# Patient Record
Sex: FEMALE | Race: WHITE
[De-identification: names, ages, dates, MRNs, and addresses within clinical notes are randomized per-mention and may not be internally consistent; named-entity substitution may affect disease eponyms.]

---

## 2020-11-13 ENCOUNTER — HOSPITAL ENCOUNTER (EMERGENCY)
Dept: HOSPITAL 47 - EC | Age: 55
Discharge: HOME | End: 2020-11-13
Payer: MEDICARE

## 2020-11-13 VITALS — SYSTOLIC BLOOD PRESSURE: 133 MMHG | DIASTOLIC BLOOD PRESSURE: 78 MMHG | HEART RATE: 90 BPM | TEMPERATURE: 98 F

## 2020-11-13 VITALS — RESPIRATION RATE: 16 BRPM
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
## 2020-11-13 DIAGNOSIS — S01.81XA: Primary | ICD-10-CM
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
## 2020-11-13 PROCEDURE — 12011 RPR F/E/E/N/L/M 2.5 CM/<: CPT

## 2020-11-13 PROCEDURE — 99283 EMERGENCY DEPT VISIT LOW MDM: CPT

## 2020-11-13 NOTE — ED
Fall HPI





- General


Chief Complaint: Fall


Stated Complaint: fall


Time Seen by Provider: 20 03:55


Source: patient, EMS


Mode of arrival: EMS





- History of Present Illness


Initial Comments: 





this patient is 54-year-old woman with history of ataxia who states she had 

fallen out of bed.  The patient had been trying to get the bathroom, but due to 

ataxia states she fell and struck her right brow.  She did not have loss of 

consciousness.  She is not complaining of pains anywhere.  She states that her 

last tetanus shot was approximately one year ago. the patient states that if she

had not sustained a laceration she would not come in.


MD Complaint: fall


Onset/Timin


-: hour(s)


Fall From: out of bed


When Fall Occurred: just prior to arrival


Fall Witnessed: no


Place Fall Occurred: home


Loss of Consciousness: none


Prolonged Down Time?: no


Symptoms Prior to Fall: other (ataxia)


Location: head


Severity: mild


Context: history of frequent falls


Associated Symptoms: denies





- Related Data


                                    Allergies











Allergy/AdvReac Type Severity Reaction Status Date / Time


 


No Known Allergies Allergy   Verified 20 03:53














Review of Systems


ROS Statement: 


Those systems with pertinent positive or pertinent negative responses have been 

documented in the HPI.





ROS Other: All systems not noted in ROS Statement are negative.


Respiratory: Denies: cough, dyspnea


Cardiovascular: Denies: chest pain, palpitations


Gastrointestinal: Denies: abdominal pain, vomiting


Musculoskeletal: Denies: back pain


Skin: Reports: as per HPI (laceration)


Neurological: Denies: headache


Hematological/Lymphatic: Denies: easy bleeding





Past Medical History


Past Medical History: Seizure Disorder


Additional Past Medical History / Comment(s): ataxia, developmental delay


History of Any Multi-Drug Resistant Organisms: None Reported


Additional Past Surgical History / Comment(s): orthopedic


Past Psychological History: Anxiety, Depression


Smoking Status: Never smoker


Past Alcohol Use History: None Reported


Past Drug Use History: None Reported





General Exam


Limitations: altered mental status


General appearance: alert, in no apparent distress


Head exam: Present: normocephalic


Eye exam: Present: normal appearance, PERRL, EOMI.  Absent: scleral icterus, 

conjunctival injection, nystagmus, periorbital swelling, periorbital tenderness


ENT exam: Present: normal oropharynx


Neck exam: Present: normal inspection, full ROM.  Absent: tenderness


Respiratory exam: Present: normal lung sounds bilaterally.  Absent: respiratory 

distress, wheezes, rales, rhonchi, stridor, chest wall tenderness


Cardiovascular Exam: Present: regular rate, normal rhythm, normal heart sounds. 

Absent: systolic murmur, diastolic murmur, rubs, gallop


GI/Abdominal exam: Present: soft.  Absent: tenderness, guarding, rebound


Extremities exam: Present: normal inspection


Neurological exam: Present: alert, oriented X3, CN II-XII intact


Skin exam: Present: warm, dry, normal color.  Absent: rash





Course


                                   Vital Signs











  20





  03:46 04:00


 


Temperature 97.9 F 


 


Pulse Rate 86 75


 


Respiratory 16 16





Rate  


 


Blood Pressure 133/68 


 


O2 Sat by Pulse 98 96





Oximetry  














Procedures





- Laceration


  ** Laceration #1


Consent Obtained: verbal consent


Indication: laceration


Site: face


Size (cm): 2


Description: linear


Depth: simple, single layer


Anesthetic Used: lidocaine 1%


Anesthesia Technique: local infiltration


Type of Sutures: nylon


Size of Sutures: 6-0


Number of Sutures: 4


Technique: simple, interrupted


Patient Tolerated Procedure: well, no complications





Disposition


Clinical Impression: 


 Fall, Laceration





Disposition: HOME SELF-CARE


Condition: Good


Instructions (If sedation given, give patient instructions):  Facial Laceration 

(ED)


Is patient prescribed a controlled substance at d/c from ED?: No


Referrals: 


None,Stated [Primary Care Provider] - 1-2 days